# Patient Record
Sex: FEMALE | Race: WHITE | NOT HISPANIC OR LATINO
[De-identification: names, ages, dates, MRNs, and addresses within clinical notes are randomized per-mention and may not be internally consistent; named-entity substitution may affect disease eponyms.]

---

## 2019-10-03 ENCOUNTER — APPOINTMENT (OUTPATIENT)
Dept: OBGYN | Facility: CLINIC | Age: 49
End: 2019-10-03
Payer: MEDICAID

## 2019-10-03 ENCOUNTER — LABORATORY RESULT (OUTPATIENT)
Age: 49
End: 2019-10-03

## 2019-10-03 PROBLEM — Z00.00 ENCOUNTER FOR PREVENTIVE HEALTH EXAMINATION: Status: ACTIVE | Noted: 2019-10-03

## 2019-10-03 PROCEDURE — 82270 OCCULT BLOOD FECES: CPT

## 2019-10-03 PROCEDURE — 99386 PREV VISIT NEW AGE 40-64: CPT

## 2019-10-03 PROCEDURE — 87086 URINE CULTURE/COLONY COUNT: CPT

## 2019-10-03 PROCEDURE — 36415 COLL VENOUS BLD VENIPUNCTURE: CPT

## 2019-10-03 NOTE — PHYSICAL EXAM
[Awake] : awake [Alert] : alert [Soft] : soft [Oriented x3] : oriented to person, place, and time [Normal] : cervix [Pap Obtained] : a Pap smear was performed [No Bleeding] : there was no active vaginal bleeding [Uterine Adnexae] : were not tender and not enlarged [Acute Distress] : no acute distress [Mass] : no breast mass [Axillary LAD] : no axillary lymphadenopathy [Nipple Discharge] : no nipple discharge [Tender] : non tender

## 2019-10-03 NOTE — PROCEDURE
[Cervical Pap Smear] : cervical Pap smear [GC & Chlamydia via Pap] : GC & Chlamydia via Pap [Liquid Base] : liquid base [Affirm (Triple Culture)] : Affirm (triple culture) [Tolerated Well] : the patient tolerated the procedure well [No Complications] : there were no complications

## 2019-10-03 NOTE — HISTORY OF PRESENT ILLNESS
[___ Year(s) Ago] : [unfilled] year(s) ago [Contraception] : uses contraception [Last Pap ___] : Last cervical pap smear was [unfilled] [Perimenopausal] : is perimenopausal [Condoms] : uses condoms [Sexually Active] : is sexually active [Currently In Menopause] : currently in menopause [unknown] : the patient is unsure of the date of her LMP [Pregnancy History] : denies prior pregnancies

## 2019-10-15 LAB
CANDIDA VAG CYTO: NOT DETECTED
ESTRADIOL SERPL-MCNC: 9 PG/ML
FSH SERPL-MCNC: 105 IU/L
G VAGINALIS+PREV SP MTYP VAG QL MICRO: DETECTED
T VAGINALIS VAG QL WET PREP: NOT DETECTED
TESTOST BND SERPL-MCNC: 1.3 PG/ML
TESTOST SERPL-MCNC: 18.4 NG/DL

## 2019-12-10 ENCOUNTER — APPOINTMENT (OUTPATIENT)
Dept: OBGYN | Facility: CLINIC | Age: 49
End: 2019-12-10
Payer: MEDICAID

## 2019-12-10 VITALS
WEIGHT: 125 LBS | BODY MASS INDEX: 22.15 KG/M2 | HEIGHT: 63 IN | DIASTOLIC BLOOD PRESSURE: 72 MMHG | SYSTOLIC BLOOD PRESSURE: 125 MMHG

## 2019-12-10 PROCEDURE — 99396 PREV VISIT EST AGE 40-64: CPT

## 2019-12-10 RX ORDER — ESTRADIOL 0.04 MG/D
0.04 PATCH, EXTENDED RELEASE TRANSDERMAL
Qty: 8 | Refills: 6 | Status: ACTIVE | COMMUNITY
Start: 2019-12-10 | End: 1900-01-01

## 2020-01-08 ENCOUNTER — APPOINTMENT (OUTPATIENT)
Dept: GYNECOLOGIC ONCOLOGY | Facility: CLINIC | Age: 50
End: 2020-01-08
Payer: MEDICAID

## 2020-01-08 VITALS
BODY MASS INDEX: 24.27 KG/M2 | SYSTOLIC BLOOD PRESSURE: 118 MMHG | WEIGHT: 137 LBS | DIASTOLIC BLOOD PRESSURE: 70 MMHG | HEIGHT: 63 IN | OXYGEN SATURATION: 98 % | HEART RATE: 86 BPM

## 2020-01-08 DIAGNOSIS — F17.200 NICOTINE DEPENDENCE, UNSPECIFIED, UNCOMPLICATED: ICD-10-CM

## 2020-01-08 PROCEDURE — 99205 OFFICE O/P NEW HI 60 MIN: CPT

## 2020-01-08 NOTE — HISTORY OF PRESENT ILLNESS
[FreeTextEntry1] : Problem\par 1) Family history of ovarian cancer- Mother and maternal grandmother; aunt\par \par Previous Therapy\par 1) Pelvic US 12/2019\par    a) uterus 7.4cm, two small fundal fibroids, no ovarian pathology\par \par 50 yo referred by Dr. Michelle for family history of ovarian cancer, desires oophorectomy and completion hysterectomy. Has not had BRCA testing and doesn’t want to get it. Started on HRT last month.

## 2020-01-08 NOTE — PHYSICAL EXAM
[Normal] : Recto-Vaginal Exam: Normal [Fully active, able to carry on all pre-disease performance without restriction] : Status 0 - Fully active, able to carry on all pre-disease performance without restriction [de-identified] : anteverted, irregular with myomas

## 2020-01-09 LAB
ALBUMIN SERPL ELPH-MCNC: 4.8 G/DL
ALP BLD-CCNC: 94 U/L
ALT SERPL-CCNC: 14 U/L
ANION GAP SERPL CALC-SCNC: 14 MMOL/L
APTT BLD: 39 SEC
AST SERPL-CCNC: 15 U/L
BASOPHILS # BLD AUTO: 0.06 K/UL
BASOPHILS NFR BLD AUTO: 0.8 %
BILIRUB SERPL-MCNC: 0.2 MG/DL
BUN SERPL-MCNC: 16 MG/DL
CALCIUM SERPL-MCNC: 10.1 MG/DL
CHLORIDE SERPL-SCNC: 104 MMOL/L
CO2 SERPL-SCNC: 23 MMOL/L
CREAT SERPL-MCNC: 0.66 MG/DL
EOSINOPHIL # BLD AUTO: 0.08 K/UL
EOSINOPHIL NFR BLD AUTO: 1.1 %
ESTIMATED AVERAGE GLUCOSE: 108 MG/DL
HBA1C MFR BLD HPLC: 5.4 %
HCT VFR BLD CALC: 44.2 %
HGB BLD-MCNC: 13.7 G/DL
IMM GRANULOCYTES NFR BLD AUTO: 0.4 %
INR PPP: 1 RATIO
LYMPHOCYTES # BLD AUTO: 1.96 K/UL
LYMPHOCYTES NFR BLD AUTO: 26.8 %
MAN DIFF?: NORMAL
MCHC RBC-ENTMCNC: 31 GM/DL
MCHC RBC-ENTMCNC: 31.9 PG
MCV RBC AUTO: 103 FL
MONOCYTES # BLD AUTO: 0.74 K/UL
MONOCYTES NFR BLD AUTO: 10.1 %
NEUTROPHILS # BLD AUTO: 4.43 K/UL
NEUTROPHILS NFR BLD AUTO: 60.8 %
PLATELET # BLD AUTO: 339 K/UL
POTASSIUM SERPL-SCNC: 4.3 MMOL/L
PROT SERPL-MCNC: 7.5 G/DL
PT BLD: 11.4 SEC
RBC # BLD: 4.29 M/UL
RBC # FLD: 13.9 %
SODIUM SERPL-SCNC: 141 MMOL/L
WBC # FLD AUTO: 7.3 K/UL

## 2020-01-21 ENCOUNTER — APPOINTMENT (OUTPATIENT)
Dept: INTERNAL MEDICINE | Facility: CLINIC | Age: 50
End: 2020-01-21
Payer: MEDICAID

## 2020-01-21 ENCOUNTER — NON-APPOINTMENT (OUTPATIENT)
Age: 50
End: 2020-01-21

## 2020-01-21 VITALS
WEIGHT: 126 LBS | HEIGHT: 63 IN | HEART RATE: 83 BPM | TEMPERATURE: 98.3 F | OXYGEN SATURATION: 99 % | BODY MASS INDEX: 22.32 KG/M2 | SYSTOLIC BLOOD PRESSURE: 102 MMHG | DIASTOLIC BLOOD PRESSURE: 70 MMHG

## 2020-01-21 DIAGNOSIS — F41.9 ANXIETY DISORDER, UNSPECIFIED: ICD-10-CM

## 2020-01-21 DIAGNOSIS — Z01.818 ENCOUNTER FOR OTHER PREPROCEDURAL EXAMINATION: ICD-10-CM

## 2020-01-21 DIAGNOSIS — K50.90 CROHN'S DISEASE, UNSPECIFIED, W/OUT COMPLICATIONS: ICD-10-CM

## 2020-01-21 DIAGNOSIS — Z01.419 ENCOUNTER FOR GYNECOLOGICAL EXAMINATION (GENERAL) (ROUTINE) W/OUT ABNORMAL FINDINGS: ICD-10-CM

## 2020-01-21 LAB — CYTOLOGY CVX/VAG DOC THIN PREP: NORMAL

## 2020-01-21 PROCEDURE — 99204 OFFICE O/P NEW MOD 45 MIN: CPT | Mod: 25

## 2020-01-21 PROCEDURE — 93000 ELECTROCARDIOGRAM COMPLETE: CPT

## 2020-01-21 RX ORDER — MESALAMINE 800 MG/1
800 TABLET, DELAYED RELEASE ORAL 3 TIMES DAILY
Refills: 0 | Status: ACTIVE | COMMUNITY

## 2020-01-21 RX ORDER — ALPRAZOLAM 0.5 MG/1
0.5 TABLET ORAL
Qty: 14 | Refills: 0 | Status: ACTIVE | COMMUNITY
Start: 2020-01-21 | End: 1900-01-01

## 2020-01-21 RX ORDER — CLINDAMYCIN PHOSPHATE 20 MG/G
2 CREAM VAGINAL DAILY
Qty: 1 | Refills: 0 | Status: COMPLETED | COMMUNITY
Start: 2019-10-15 | End: 2020-01-21

## 2020-01-21 NOTE — ASSESSMENT
[No Further Testing Recommended] : no further testing recommended [Patient Optimized for Surgery] : Patient optimized for surgery [FreeTextEntry4] : LOW risk for LOW risk procedure. May proceed with elective surgery.

## 2020-01-21 NOTE — HISTORY OF PRESENT ILLNESS
[No Pertinent Cardiac History] : no history of aortic stenosis, atrial fibrillation, coronary artery disease, recent myocardial infarction, or implantable device/pacemaker [No Pertinent Pulmonary History] : no history of asthma, COPD, sleep apnea, or smoking [No Adverse Anesthesia Reaction] : no adverse anesthesia reaction in self or family member [Excellent (>10 METs)] : Excellent (>10 METs) [Chronic Anticoagulation] : no chronic anticoagulation [Diabetes] : no diabetes [FreeTextEntry1] : laparoscopic total robotic assisted hysterectomy w/ b/l salpingo-ooporectomy. [FreeTextEntry3] : Dr. Ricketts [FreeTextEntry2] : 2/4/2020 [FreeTextEntry4] : h/o fibroid uterus a/w metrorrhagia, strong fhx of ovarian CA. Scheduled for lap hysterectomy and BSO. Very anxious about surgery. Has had increased panic attacks this week.

## 2020-02-03 VITALS
TEMPERATURE: 98 F | RESPIRATION RATE: 16 BRPM | WEIGHT: 129.41 LBS | HEIGHT: 63 IN | SYSTOLIC BLOOD PRESSURE: 108 MMHG | OXYGEN SATURATION: 100 % | DIASTOLIC BLOOD PRESSURE: 72 MMHG | HEART RATE: 71 BPM

## 2020-02-03 RX ORDER — IBUPROFEN 800 MG/1
800 TABLET, FILM COATED ORAL 3 TIMES DAILY
Qty: 45 | Refills: 0 | Status: ACTIVE | COMMUNITY
Start: 2020-02-03 | End: 1900-01-01

## 2020-02-04 ENCOUNTER — APPOINTMENT (OUTPATIENT)
Dept: GYNECOLOGIC ONCOLOGY | Facility: HOSPITAL | Age: 50
End: 2020-02-04

## 2020-02-04 ENCOUNTER — RESULT REVIEW (OUTPATIENT)
Age: 50
End: 2020-02-04

## 2020-02-04 ENCOUNTER — OUTPATIENT (OUTPATIENT)
Dept: OUTPATIENT SERVICES | Facility: HOSPITAL | Age: 50
LOS: 1 days | Discharge: HOME | End: 2020-02-04
Payer: MEDICAID

## 2020-02-04 VITALS
RESPIRATION RATE: 22 BRPM | HEART RATE: 85 BPM | DIASTOLIC BLOOD PRESSURE: 58 MMHG | OXYGEN SATURATION: 99 % | SYSTOLIC BLOOD PRESSURE: 120 MMHG

## 2020-02-04 DIAGNOSIS — Z98.890 OTHER SPECIFIED POSTPROCEDURAL STATES: Chronic | ICD-10-CM

## 2020-02-04 DIAGNOSIS — D25.9 LEIOMYOMA OF UTERUS, UNSPECIFIED: ICD-10-CM

## 2020-02-04 DIAGNOSIS — Z80.41 FAMILY HISTORY OF MALIGNANT NEOPLASM OF OVARY: ICD-10-CM

## 2020-02-04 DIAGNOSIS — N20.0 CALCULUS OF KIDNEY: Chronic | ICD-10-CM

## 2020-02-04 DIAGNOSIS — N80.9 ENDOMETRIOSIS, UNSPECIFIED: ICD-10-CM

## 2020-02-04 PROBLEM — K50.90 CROHN'S DISEASE, UNSPECIFIED, WITHOUT COMPLICATIONS: Chronic | Status: ACTIVE | Noted: 2020-02-03

## 2020-02-04 LAB
GLUCOSE BLDC GLUCOMTR-MCNC: 107 MG/DL — HIGH (ref 70–99)
GLUCOSE BLDC GLUCOMTR-MCNC: 79 MG/DL — SIGNIFICANT CHANGE UP (ref 70–99)

## 2020-02-04 PROCEDURE — 58552 LAPARO-VAG HYST INCL T/O: CPT

## 2020-02-04 PROCEDURE — 88307 TISSUE EXAM BY PATHOLOGIST: CPT

## 2020-02-04 PROCEDURE — 88307 TISSUE EXAM BY PATHOLOGIST: CPT | Mod: 26

## 2020-02-04 PROCEDURE — 82962 GLUCOSE BLOOD TEST: CPT

## 2020-02-04 PROCEDURE — 86850 RBC ANTIBODY SCREEN: CPT

## 2020-02-04 PROCEDURE — S2900: CPT

## 2020-02-04 PROCEDURE — 88305 TISSUE EXAM BY PATHOLOGIST: CPT

## 2020-02-04 PROCEDURE — 88112 CYTOPATH CELL ENHANCE TECH: CPT

## 2020-02-04 PROCEDURE — 88112 CYTOPATH CELL ENHANCE TECH: CPT | Mod: 26

## 2020-02-04 PROCEDURE — C9399: CPT

## 2020-02-04 PROCEDURE — 88305 TISSUE EXAM BY PATHOLOGIST: CPT | Mod: 26

## 2020-02-04 PROCEDURE — 86901 BLOOD TYPING SEROLOGIC RH(D): CPT

## 2020-02-04 RX ORDER — HYDROMORPHONE HYDROCHLORIDE 2 MG/ML
0.5 INJECTION INTRAMUSCULAR; INTRAVENOUS; SUBCUTANEOUS
Refills: 0 | Status: DISCONTINUED | OUTPATIENT
Start: 2020-02-04 | End: 2020-02-04

## 2020-02-04 RX ORDER — GABAPENTIN 400 MG/1
600 CAPSULE ORAL ONCE
Refills: 0 | Status: COMPLETED | OUTPATIENT
Start: 2020-02-04 | End: 2020-02-04

## 2020-02-04 RX ORDER — CELECOXIB 200 MG/1
400 CAPSULE ORAL ONCE
Refills: 0 | Status: COMPLETED | OUTPATIENT
Start: 2020-02-04 | End: 2020-02-04

## 2020-02-04 RX ORDER — OXYCODONE HYDROCHLORIDE 5 MG/1
10 TABLET ORAL ONCE
Refills: 0 | Status: DISCONTINUED | OUTPATIENT
Start: 2020-02-04 | End: 2020-02-04

## 2020-02-04 RX ORDER — OXYCODONE HYDROCHLORIDE 5 MG/1
5 TABLET ORAL ONCE
Refills: 0 | Status: DISCONTINUED | OUTPATIENT
Start: 2020-02-04 | End: 2020-02-04

## 2020-02-04 RX ORDER — HEPARIN SODIUM 5000 [USP'U]/ML
5000 INJECTION INTRAVENOUS; SUBCUTANEOUS ONCE
Refills: 0 | Status: COMPLETED | OUTPATIENT
Start: 2020-02-04 | End: 2020-02-04

## 2020-02-04 RX ORDER — ACETAMINOPHEN 500 MG
1000 TABLET ORAL ONCE
Refills: 0 | Status: COMPLETED | OUTPATIENT
Start: 2020-02-04 | End: 2020-02-04

## 2020-02-04 RX ORDER — SODIUM CHLORIDE 9 MG/ML
1000 INJECTION, SOLUTION INTRAVENOUS
Refills: 0 | Status: DISCONTINUED | OUTPATIENT
Start: 2020-02-04 | End: 2020-02-04

## 2020-02-04 RX ORDER — METOCLOPRAMIDE HCL 10 MG
10 TABLET ORAL ONCE
Refills: 0 | Status: DISCONTINUED | OUTPATIENT
Start: 2020-02-04 | End: 2020-02-04

## 2020-02-04 RX ORDER — ONDANSETRON 8 MG/1
4 TABLET, FILM COATED ORAL ONCE
Refills: 0 | Status: DISCONTINUED | OUTPATIENT
Start: 2020-02-04 | End: 2020-02-04

## 2020-02-04 RX ADMIN — CELECOXIB 400 MILLIGRAM(S): 200 CAPSULE ORAL at 06:44

## 2020-02-04 RX ADMIN — Medication 1000 MILLIGRAM(S): at 06:44

## 2020-02-04 RX ADMIN — HYDROMORPHONE HYDROCHLORIDE 0.5 MILLIGRAM(S): 2 INJECTION INTRAMUSCULAR; INTRAVENOUS; SUBCUTANEOUS at 10:29

## 2020-02-04 RX ADMIN — OXYCODONE HYDROCHLORIDE 10 MILLIGRAM(S): 5 TABLET ORAL at 11:34

## 2020-02-04 RX ADMIN — GABAPENTIN 600 MILLIGRAM(S): 400 CAPSULE ORAL at 06:45

## 2020-02-04 RX ADMIN — HYDROMORPHONE HYDROCHLORIDE 0.5 MILLIGRAM(S): 2 INJECTION INTRAMUSCULAR; INTRAVENOUS; SUBCUTANEOUS at 11:23

## 2020-02-04 RX ADMIN — HEPARIN SODIUM 5000 UNIT(S): 5000 INJECTION INTRAVENOUS; SUBCUTANEOUS at 06:44

## 2020-02-04 NOTE — ASU DISCHARGE PLAN (ADULT/PEDIATRIC) - CARE PROVIDER_API CALL
Roslyn Ricketts (DO)  Gynecologic Oncology; Obstetrics and Gynecology  [address], [address]  [place], [place] [address]  Phone: [phone]  Fax: [phone]  Follow Up Time:

## 2020-02-04 NOTE — BRIEF OPERATIVE NOTE - NSICDXBRIEFPREOP_GEN_ALL_CORE_FT
PRE-OP DIAGNOSIS:  Fibroid uterus 04-Feb-2020 09:55:03  Karla Neely  Family history of ovarian cancer 04-Feb-2020 09:54:57  Karla Neely

## 2020-02-04 NOTE — BRIEF OPERATIVE NOTE - NSICDXBRIEFPOSTOP_GEN_ALL_CORE_FT
POST-OP DIAGNOSIS:  Endometriosis 04-Feb-2020 09:55:44  Karla Neely  Fibroid uterus 04-Feb-2020 09:55:23  Karla Neely  Family history of ovarian cancer 04-Feb-2020 09:55:13  Karla Neely

## 2020-02-04 NOTE — BRIEF OPERATIVE NOTE - NSICDXBRIEFPROCEDURE_GEN_ALL_CORE_FT
PROCEDURES:  Hysterectomy, total, robot-assisted, laparoscopic, with oophorectomy, for uterus greater than 250 grams 04-Feb-2020 09:54:30 with bilateral salpingo-oophorectomy Karla Neely

## 2020-02-04 NOTE — BRIEF OPERATIVE NOTE - OPERATION/FINDINGS
endometriosis in cul-de-sac and broad ligament, normal appearing ovaries, fibroid uterus ~12 cm, normal appearing fallopian tubes. bilateral ureters identified.

## 2020-02-04 NOTE — ASU DISCHARGE PLAN (ADULT/PEDIATRIC) - ACTIVITY LEVEL
No excercise/No heavy lifting/No sports/gym/No intercourse No intercourse/No douching/No excercise/No heavy lifting/No sports/gym/Nothing per vagina/No tampons

## 2020-02-04 NOTE — PROGRESS NOTE ADULT - SUBJECTIVE AND OBJECTIVE BOX
GYN POST-OPERATIVE CHECK  Patient seen at bedside.  Pain controlled. Tolerating sips.  No OOB yet.  Valdez in place.    Denies CP, palpitations, SOB, fever, chills, nausea, vomiting.    Vital Signs Last 24 Hrs  T(C): 36.4 (04 Feb 2020 09:50), Max: 36.4 (04 Feb 2020 09:50)  T(F): 97.6 (04 Feb 2020 09:50), Max: 97.6 (04 Feb 2020 09:50)  HR: 85 (04 Feb 2020 12:02) (72 - 93)  BP: 120/58 (04 Feb 2020 12:02) (112/55 - 130/62)  BP(mean): 87 (04 Feb 2020 11:08) (78 - 88)  RR: 22 (04 Feb 2020 12:02) (13 - 38)  SpO2: 99% (04 Feb 2020 12:02) (97% - 100%)    Physical Exam:  Gen: No Acute Distress  Pulm: Clear to auscultation bilaterally  GI: soft, appropriately tender, non-distended, +BS, no rebound, no guarding.  Dressing C/D/I  : Valdez in place  Ext: SCDs in place, no edema/erythema/tenderness    I&O's Summary    04 Feb 2020 07:01  -  04 Feb 2020 12:29  --------------------------------------------------------  IN: 300 mL / OUT: 100 mL / NET: 200 mL      MEDICATIONS  (STANDING):  lactated ringers. 1000 milliLiter(s) (100 mL/Hr) IV Continuous <Continuous>    MEDICATIONS  (PRN):  HYDROmorphone  Injectable 0.5 milliGRAM(s) IV Push every 15 minutes PRN Severe Pain (7 - 10)  metoclopramide Injectable 10 milliGRAM(s) IV Push Once PRN Nausea and/or Vomiting  ondansetron Injectable 4 milliGRAM(s) IV Push Once PRN Postoperative Nausea and/or Vomiting  oxyCODONE    IR 5 milliGRAM(s) Oral Once PRN Mild Pain (1 - 3)    Allergies    iodine (Rash)  Pyridium (Nausea)  shellfish (Rash)

## 2020-02-04 NOTE — PROGRESS NOTE ADULT - ASSESSMENT
49y Female POD#0 s/p R/A TLH and BSO, uncomplicated.                                     1. Neuro/Pain:  Tylenol, Toradol and dilaudid  2  CV:   VS per routine  3. Pulm: Encourage ISS  4. GI: regular diet  5. :  voided  6. Heme: --  7. ID: --  8. DVT ppx: SCDs  9. Dispo: Likely POD#0    Plan to d/c home.

## 2020-02-05 LAB — NON-GYNECOLOGICAL CYTOLOGY STUDY: SIGNIFICANT CHANGE UP

## 2020-02-12 LAB — SURGICAL PATHOLOGY STUDY: SIGNIFICANT CHANGE UP

## 2020-02-13 ENCOUNTER — APPOINTMENT (OUTPATIENT)
Dept: GYNECOLOGIC ONCOLOGY | Facility: CLINIC | Age: 50
End: 2020-02-13
Payer: MEDICAID

## 2020-02-13 VITALS
HEART RATE: 84 BPM | WEIGHT: 126 LBS | DIASTOLIC BLOOD PRESSURE: 68 MMHG | BODY MASS INDEX: 22.32 KG/M2 | HEIGHT: 63 IN | SYSTOLIC BLOOD PRESSURE: 100 MMHG | OXYGEN SATURATION: 98 %

## 2020-02-13 PROCEDURE — 99024 POSTOP FOLLOW-UP VISIT: CPT

## 2020-02-13 RX ORDER — PROGESTERONE 100 MG/1
100 CAPSULE ORAL
Qty: 30 | Refills: 6 | Status: COMPLETED | COMMUNITY
Start: 2019-12-10 | End: 2020-02-13

## 2020-02-13 RX ORDER — OXYCODONE AND ACETAMINOPHEN 5; 325 MG/1; MG/1
5-325 TABLET ORAL
Qty: 10 | Refills: 0 | Status: ACTIVE | COMMUNITY
Start: 2020-02-03 | End: 1900-01-01

## 2020-02-13 RX ORDER — DOCUSATE SODIUM 100 MG/1
100 CAPSULE ORAL TWICE DAILY
Qty: 60 | Refills: 0 | Status: COMPLETED | COMMUNITY
Start: 2020-02-03 | End: 2020-02-13

## 2020-02-13 NOTE — HISTORY OF PRESENT ILLNESS
[FreeTextEntry1] : Problem\par 1) Family history of ovarian cancer- Mother and maternal grandmother; aunt\par \par Previous Therapy\par 1) Pelvic US 12/2019\par    a) uterus 7.4cm, two small fundal fibroids, no ovarian pathology\par 2) Advanced laparoscopic robotic assisted hysterectomy, BSO 2/4/2020\par    a) benign - endometriotic cyst on R ovary, minute focus of endometriosis in L ovary. Small fibroids (6 sizes 0.5cm-2.5cm)\par \par Here for 1 week post op. Feeling well. Not taking HRT, no symptoms currently. Taking percocet at night, taking Ibuprofen during the day. Feels bloated, but normal GI/ function.

## 2020-02-13 NOTE — PHYSICAL EXAM
[Normal] : Recent and remote memory: Intact [Fully active, able to carry on all pre-disease performance without restriction] : Status 0 - Fully active, able to carry on all pre-disease performance without restriction

## 2020-02-13 NOTE — DISCUSSION/SUMMARY
[Reviewed Clinical Lab Test(s)] : Results of clinical tests were reviewed. [Reviewed Radiology Report(s)] : Radiology reports were reviewed. [Discuss Alternatives/Risks/Benefits w/Patient] : All alternatives, risks, and benefits were discussed with the patient/family and all questions were answered.  Patient expressed good understanding and appreciates the importance of follow up as recommended. [Pre Op] : The differential diagnosis was discussed in detail. The indications, risks, benefits and alternatives were discussed. [unfilled] expressed an understanding of the treatment rationale and her questions were answered to her apparent satisfaction. [FreeTextEntry2] : extensive paternal family history of ovary and kidney cancer [FreeTextEntry1] : Benign pathology reviewed in detail\par Excellent post operative recovery\par Post operative restrictions reviewed in detail\par Follow up in 3 weeks with Dr. Ricketts

## 2020-03-04 ENCOUNTER — APPOINTMENT (OUTPATIENT)
Dept: GYNECOLOGIC ONCOLOGY | Facility: CLINIC | Age: 50
End: 2020-03-04
Payer: MEDICAID

## 2020-03-04 VITALS
OXYGEN SATURATION: 97 % | WEIGHT: 126 LBS | HEIGHT: 63 IN | BODY MASS INDEX: 22.32 KG/M2 | SYSTOLIC BLOOD PRESSURE: 126 MMHG | HEART RATE: 86 BPM | DIASTOLIC BLOOD PRESSURE: 84 MMHG

## 2020-03-04 DIAGNOSIS — D25.9 LEIOMYOMA OF UTERUS, UNSPECIFIED: ICD-10-CM

## 2020-03-04 DIAGNOSIS — Z80.41 FAMILY HISTORY OF MALIGNANT NEOPLASM OF OVARY: ICD-10-CM

## 2020-03-04 DIAGNOSIS — N95.9 UNSPECIFIED MENOPAUSAL AND PERIMENOPAUSAL DISORDER: ICD-10-CM

## 2020-03-04 PROCEDURE — 99024 POSTOP FOLLOW-UP VISIT: CPT

## 2020-03-04 NOTE — DISCUSSION/SUMMARY
[Reviewed Clinical Lab Test(s)] : Results of clinical tests were reviewed. [Discuss Alternatives/Risks/Benefits w/Patient] : All alternatives, risks, and benefits were discussed with the patient/family and all questions were answered.  Patient expressed good understanding and appreciates the importance of follow up as recommended. [Reviewed Radiology Report(s)] : Radiology reports were reviewed. [Pre Op] : The differential diagnosis was discussed in detail. The indications, risks, benefits and alternatives were discussed. [unfilled] expressed an understanding of the treatment rationale and her questions were answered to her apparent satisfaction. [FreeTextEntry2] : extensive paternal family history of ovary and kidney cancer [FreeTextEntry1] : Benign pathology reviewed in detail\par Excellent post operative recovery\par Further care with benign gyn, Dr. Santana

## 2020-03-04 NOTE — HISTORY OF PRESENT ILLNESS
[FreeTextEntry1] : Problem\par 1) Family history of ovarian cancer- Mother and maternal grandmother; aunt\par \par Previous Therapy\par 1) Pelvic US 12/2019\par    a) uterus 7.4cm, two small fundal fibroids, no ovarian pathology\par 2) Advanced laparoscopic robotic assisted hysterectomy, BSO 2/4/2020\par    a) benign - endometriotic cyst on R ovary, minute focus of endometriosis in L ovary. Small fibroids (6 sizes 0.5cm-2.5cm)\par \par Here for 1 month post op. Feeling well. Not taking HRT, no symptoms currently. Had fever after 1 week post op and was diagnosed with sinus infection.

## 2020-03-04 NOTE — PHYSICAL EXAM
[Absent] : Uterus: Absent [Normal] : Bimanual Exam: Normal [Fully active, able to carry on all pre-disease performance without restriction] : Status 0 - Fully active, able to carry on all pre-disease performance without restriction [de-identified] : well healed vaginal cuff

## 2020-03-05 LAB
APPEARANCE: CLEAR
BACTERIA: NEGATIVE
BILIRUBIN URINE: NEGATIVE
BLOOD URINE: NEGATIVE
COLOR: YELLOW
GLUCOSE QUALITATIVE U: NEGATIVE
HYALINE CASTS: 1 /LPF
KETONES URINE: NEGATIVE
LEUKOCYTE ESTERASE URINE: NEGATIVE
MICROSCOPIC-UA: NORMAL
NITRITE URINE: NEGATIVE
PH URINE: 5.5
PROTEIN URINE: NEGATIVE
RED BLOOD CELLS URINE: 2 /HPF
SPECIFIC GRAVITY URINE: 1.02
SQUAMOUS EPITHELIAL CELLS: 1 /HPF
UROBILINOGEN URINE: NORMAL
WHITE BLOOD CELLS URINE: 1 /HPF

## 2020-03-06 LAB — BACTERIA UR CULT: NORMAL

## 2020-12-01 ENCOUNTER — APPOINTMENT (OUTPATIENT)
Dept: OBGYN | Facility: CLINIC | Age: 50
End: 2020-12-01
Payer: MEDICAID

## 2020-12-01 VITALS
BODY MASS INDEX: 20.91 KG/M2 | WEIGHT: 118 LBS | DIASTOLIC BLOOD PRESSURE: 78 MMHG | SYSTOLIC BLOOD PRESSURE: 120 MMHG | HEIGHT: 63 IN

## 2020-12-01 DIAGNOSIS — Z90.710 ACQUIRED ABSENCE OF BOTH CERVIX AND UTERUS: ICD-10-CM

## 2020-12-01 DIAGNOSIS — Z90.722 ACQUIRED ABSENCE OF BOTH CERVIX AND UTERUS: ICD-10-CM

## 2020-12-01 DIAGNOSIS — Z90.79 ACQUIRED ABSENCE OF BOTH CERVIX AND UTERUS: ICD-10-CM

## 2020-12-01 PROCEDURE — 99072 ADDL SUPL MATRL&STAF TM PHE: CPT

## 2020-12-01 PROCEDURE — 99396 PREV VISIT EST AGE 40-64: CPT

## 2020-12-01 NOTE — PHYSICAL EXAM
[Appropriately responsive] : appropriately responsive [Alert] : alert [No Acute Distress] : no acute distress [No Lymphadenopathy] : no lymphadenopathy [Regular Rate Rhythm] : regular rate rhythm [No Murmurs] : no murmurs [Clear to Auscultation B/L] : clear to auscultation bilaterally [Soft] : soft [Non-tender] : non-tender [Non-distended] : non-distended [No HSM] : No HSM [No Lesions] : no lesions [No Mass] : no mass [Oriented x3] : oriented x3 [Examination Of The Breasts] : a normal appearance [Normal] : normal [No Masses] : no breast masses were palpable [Absent] : absent

## 2020-12-01 NOTE — DISCUSSION/SUMMARY
[FreeTextEntry1] : Ms. SHAY GARCIA is a 50 year year old who presents today for an Annual Exam. SHAY  has no complaints.\par -referral given for breast imaging\par -pap smear done\par -vitamine and supplements reviewed\par -she had been advised to follow up in one (1) year.\par \par \par

## 2020-12-07 RX ORDER — ESTRADIOL 0.1 MG/G
0.1 CREAM VAGINAL
Qty: 1 | Refills: 5 | Status: ACTIVE | COMMUNITY
Start: 2020-12-07 | End: 1900-01-01

## 2025-01-03 ENCOUNTER — NON-APPOINTMENT (OUTPATIENT)
Age: 55
End: 2025-01-03